# Patient Record
Sex: FEMALE | Race: WHITE | NOT HISPANIC OR LATINO | URBAN - METROPOLITAN AREA
[De-identification: names, ages, dates, MRNs, and addresses within clinical notes are randomized per-mention and may not be internally consistent; named-entity substitution may affect disease eponyms.]

---

## 2018-01-31 ENCOUNTER — OFFICE (OUTPATIENT)
Dept: URBAN - METROPOLITAN AREA CLINIC 101 | Facility: CLINIC | Age: 53
End: 2018-01-31

## 2018-01-31 VITALS
WEIGHT: 293 LBS | SYSTOLIC BLOOD PRESSURE: 123 MMHG | HEIGHT: 64 IN | DIASTOLIC BLOOD PRESSURE: 73 MMHG | TEMPERATURE: 97.9 F | HEART RATE: 79 BPM

## 2018-01-31 DIAGNOSIS — K22.70 BARRETT'S ESOPHAGUS WITHOUT DYSPLASIA: ICD-10-CM

## 2018-01-31 PROCEDURE — 99243 OFF/OP CNSLTJ NEW/EST LOW 30: CPT

## 2018-01-31 RX ORDER — ESOMEPRAZOLE MAGNESIUM 40 MG/1
CAPSULE, DELAYED RELEASE ORAL
Qty: 30 | Refills: 11 | Status: ACTIVE
Start: 2018-01-31

## 2018-01-31 NOTE — SERVICEHPINOTES
HORACIO PHILLIPS   is a   52   year old    female who is being seen in consultation at the request of   ROE DEL CID   for Medrano's esophagus. Last EGD was 2/2015, revealed esophagitis, pedunculated polyps throughout entire stomach, and presumed Medrano's without dysplasia (we do not have the biopsy report). She takes 2 Nexium OTC pills every night and then 2 Rolaids a few hours later. She denies any breakthrough symptoms on this regimen. No dysphagia, n/v, abdominal pain. Her  is in the [facility] department and she was living in [facility] from 3022-2139, which is where the most recent EGD was completed. She is leaving for [facility] March 1st and she needs an EGD for her medical clearance.Colonoscopy in 2015,normal, recall 10 years.

## 2018-02-28 ENCOUNTER — ON CAMPUS - OUTPATIENT (OUTPATIENT)
Dept: URBAN - METROPOLITAN AREA HOSPITAL 35 | Facility: HOSPITAL | Age: 53
End: 2018-02-28

## 2018-02-28 DIAGNOSIS — K31.7 POLYP OF STOMACH AND DUODENUM: ICD-10-CM

## 2018-02-28 DIAGNOSIS — K22.70 BARRETT'S ESOPHAGUS WITHOUT DYSPLASIA: ICD-10-CM

## 2018-02-28 DIAGNOSIS — K29.60 OTHER GASTRITIS WITHOUT BLEEDING: ICD-10-CM

## 2018-02-28 DIAGNOSIS — K20.8 OTHER ESOPHAGITIS: ICD-10-CM

## 2018-02-28 PROCEDURE — 43239 EGD BIOPSY SINGLE/MULTIPLE: CPT
